# Patient Record
Sex: MALE | Race: WHITE | NOT HISPANIC OR LATINO | URBAN - METROPOLITAN AREA
[De-identification: names, ages, dates, MRNs, and addresses within clinical notes are randomized per-mention and may not be internally consistent; named-entity substitution may affect disease eponyms.]

---

## 2023-02-13 ENCOUNTER — TELEPHONE (OUTPATIENT)
Dept: ORTHOPEDICS | Facility: CLINIC | Age: 28
End: 2023-02-13
Payer: COMMERCIAL

## 2023-02-13 ENCOUNTER — OFFICE VISIT (OUTPATIENT)
Dept: URGENT CARE | Facility: CLINIC | Age: 28
End: 2023-02-13
Payer: COMMERCIAL

## 2023-02-13 VITALS
OXYGEN SATURATION: 98 % | RESPIRATION RATE: 18 BRPM | SYSTOLIC BLOOD PRESSURE: 153 MMHG | DIASTOLIC BLOOD PRESSURE: 81 MMHG | TEMPERATURE: 99 F | HEART RATE: 88 BPM

## 2023-02-13 DIAGNOSIS — S82.444A CLOSED NONDISPLACED SPIRAL FRACTURE OF SHAFT OF RIGHT FIBULA, INITIAL ENCOUNTER: ICD-10-CM

## 2023-02-13 DIAGNOSIS — T14.90XA TRAUMA: Primary | ICD-10-CM

## 2023-02-13 PROCEDURE — 1160F PR REVIEW ALL MEDS BY PRESCRIBER/CLIN PHARMACIST DOCUMENTED: ICD-10-PCS | Mod: CPTII,S$GLB,, | Performed by: SURGERY

## 2023-02-13 PROCEDURE — 99214 PR OFFICE/OUTPT VISIT, EST, LEVL IV, 30-39 MIN: ICD-10-PCS | Mod: S$GLB,,, | Performed by: SURGERY

## 2023-02-13 PROCEDURE — 73630 X-RAY EXAM OF FOOT: CPT | Mod: RT,S$GLB,, | Performed by: RADIOLOGY

## 2023-02-13 PROCEDURE — 73610 XR ANKLE COMPLETE 3 VIEW RIGHT: ICD-10-PCS | Mod: RT,S$GLB,, | Performed by: RADIOLOGY

## 2023-02-13 PROCEDURE — 1159F PR MEDICATION LIST DOCUMENTED IN MEDICAL RECORD: ICD-10-PCS | Mod: CPTII,S$GLB,, | Performed by: SURGERY

## 2023-02-13 PROCEDURE — 3079F PR MOST RECENT DIASTOLIC BLOOD PRESSURE 80-89 MM HG: ICD-10-PCS | Mod: CPTII,S$GLB,, | Performed by: SURGERY

## 2023-02-13 PROCEDURE — 3079F DIAST BP 80-89 MM HG: CPT | Mod: CPTII,S$GLB,, | Performed by: SURGERY

## 2023-02-13 PROCEDURE — 1159F MED LIST DOCD IN RCRD: CPT | Mod: CPTII,S$GLB,, | Performed by: SURGERY

## 2023-02-13 PROCEDURE — 3077F PR MOST RECENT SYSTOLIC BLOOD PRESSURE >= 140 MM HG: ICD-10-PCS | Mod: CPTII,S$GLB,, | Performed by: SURGERY

## 2023-02-13 PROCEDURE — 3077F SYST BP >= 140 MM HG: CPT | Mod: CPTII,S$GLB,, | Performed by: SURGERY

## 2023-02-13 PROCEDURE — 73630 XR FOOT COMPLETE 3 VIEW RIGHT: ICD-10-PCS | Mod: RT,S$GLB,, | Performed by: RADIOLOGY

## 2023-02-13 PROCEDURE — 73610 X-RAY EXAM OF ANKLE: CPT | Mod: RT,S$GLB,, | Performed by: RADIOLOGY

## 2023-02-13 PROCEDURE — 1160F RVW MEDS BY RX/DR IN RCRD: CPT | Mod: CPTII,S$GLB,, | Performed by: SURGERY

## 2023-02-13 PROCEDURE — 99214 OFFICE O/P EST MOD 30 MIN: CPT | Mod: S$GLB,,, | Performed by: SURGERY

## 2023-02-13 RX ORDER — OXYCODONE AND ACETAMINOPHEN 7.5; 325 MG/1; MG/1
1 TABLET ORAL EVERY 6 HOURS PRN
Qty: 15 TABLET | Refills: 0 | Status: SHIPPED | OUTPATIENT
Start: 2023-02-13

## 2023-02-13 NOTE — TELEPHONE ENCOUNTER
----- Message from Amy Cerrato LPN sent at 2/13/2023  3:17 PM CST -----    ----- Message -----  From: Natan Goel  Sent: 2/13/2023   2:59 PM CST  To: [facility] Ortho Triage    Good afternoon, Dr. Mary Jo Viveros calling from [facility] for Pt to be sched from a fall.    Order is in Active Requests- Closed nondisplaced spiral fracture of shaft of right fibula, initial encounter ...    Please eval and treat    Thank you,    Natan Goel  [facility]

## 2023-02-13 NOTE — PROGRESS NOTES
Subjective:       Patient ID: Garrett Hinkle is a 27 y.o. male.    Vitals:  core esophageal temperature is 98.7 °F (37.1 °C). His blood pressure is 153/81 (abnormal) and his pulse is 88. His respiration is 18 and oxygen saturation is 98%.     Chief Complaint: Ankle Pain    Patient presents with c.o rt ankle pain. Pain secondary to an injury that occurred while walking down the stairs. Patient with 5th metatarsal pain and significant bruising on the lateral aspect of hsi foot and ankle. No head trauma.     Ankle Pain   Incident onset: 2 days ago. Incident location: parents home. Injury mechanism: walking down steps. Pain location: rt ankle. The quality of the pain is described as aching. Pertinent negatives include no inability to bear weight, loss of motion or loss of sensation. He reports no foreign bodies present. The symptoms are aggravated by movement, palpation and weight bearing. He has tried nothing for the symptoms.   ROS    Objective:      Physical Exam   Constitutional: He is oriented to person, place, and time. He appears well-developed. He is cooperative.  Non-toxic appearance. He does not appear ill. No distress.   HENT:   Head: Normocephalic and atraumatic. Head is without abrasion, without contusion and without laceration.   Ears:   Right Ear: Hearing, tympanic membrane, external ear and ear canal normal. No hemotympanum.   Left Ear: Hearing, tympanic membrane, external ear and ear canal normal. No hemotympanum.   Nose: Nose normal. No mucosal edema, rhinorrhea or nasal deformity. No epistaxis. Right sinus exhibits no maxillary sinus tenderness and no frontal sinus tenderness. Left sinus exhibits no maxillary sinus tenderness and no frontal sinus tenderness.   Mouth/Throat: Uvula is midline, oropharynx is clear and moist and mucous membranes are normal. No trismus in the jaw. Normal dentition. No uvula swelling. No posterior oropharyngeal erythema.   Eyes: Conjunctivae, EOM and lids are normal.  Pupils are equal, round, and reactive to light. Right eye exhibits no discharge. Left eye exhibits no discharge. No scleral icterus.   Neck: Trachea normal and phonation normal. Neck supple. No tracheal deviation present. No neck rigidity present. No spinous process tenderness present. No muscular tenderness present.   Cardiovascular: Normal rate, regular rhythm, normal heart sounds and normal pulses.   Pulmonary/Chest: Effort normal and breath sounds normal. No respiratory distress.   Abdominal: Normal appearance and bowel sounds are normal. He exhibits no distension and no mass. Soft. There is no abdominal tenderness.   Musculoskeletal:         General: No deformity.      Right ankle: He exhibits decreased range of motion, swelling and ecchymosis. Tenderness. Lateral malleolus tenderness found.      Right foot: Swelling present. No tenderness or bony tenderness.   Neurological: He is alert and oriented to person, place, and time. He has normal strength. No cranial nerve deficit or sensory deficit. He exhibits normal muscle tone. He displays no seizure activity. Coordination normal. GCS eye subscore is 4. GCS verbal subscore is 5. GCS motor subscore is 6.   Skin: Skin is warm, dry, intact, not diaphoretic and not pale. Capillary refill takes less than 2 seconds. No abrasion, No burn, No bruising and No ecchymosis   Psychiatric: His speech is normal and behavior is normal. Judgment and thought content normal.   Nursing note and vitals reviewed.      Assessment:       1. Trauma    2. Closed nondisplaced spiral fracture of shaft of right fibula, initial encounter            Plan:         Trauma  -     XR ANKLE COMPLETE 3 VIEW RIGHT; Future; Expected date: 02/13/2023  -     XR FOOT COMPLETE 3 VIEW RIGHT; Future; Expected date: 02/13/2023    Closed nondisplaced spiral fracture of shaft of right fibula, initial encounter  -     Ambulatory referral/consult to Orthopedics  -     CRUTCHES FOR HOME USE    XR ANKLE COMPLETE 3  VIEW RIGHT    Result Date: 2/13/2023  EXAMINATION: XR ANKLE COMPLETE 3 VIEW RIGHT CLINICAL HISTORY: Injury, unspecified, initial encounter TECHNIQUE: AP, lateral, and oblique images of the right ankle were performed. COMPARISON: None FINDINGS: There is a nondisplaced oblique fracture identified involving the lateral malleolus.  Slight degree of soft tissue swelling noted.  No other fractures or dislocation.     See above Electronically signed by: Matt Tirado MD Date:    02/13/2023 Time:    14:27    XR FOOT COMPLETE 3 VIEW RIGHT    Result Date: 2/13/2023  EXAMINATION: XR FOOT COMPLETE 3 VIEW RIGHT CLINICAL HISTORY: . Injury, unspecified, initial encounter TECHNIQUE: AP, lateral, and oblique views of the right foot were performed. COMPARISON: None FINDINGS: There is a nondisplaced fracture identified involving the lateral malleolus.  No other fractures or dislocation.  Mild hallux valgus.  No bone destruction identified     See above Electronically signed by: Matt Tirado MD Date:    02/13/2023 Time:    14:26        Distal calf and foot with bruising and swelling, therefore will ACE wrap for compression and train with nonweightbearing/crutches until seen by ortho for definitive management. He has been walking on it for 2 days prior to this visit.   Pain management discussed with NSAID, rst, ice, elevation and backup narcotic prescribed to be used as needed. Precautions given re narcotics.    contacted to expedite ortho referral. Ortho clinic will be calling pt per protocol given diagnosis.

## 2023-02-13 NOTE — TELEPHONE ENCOUNTER
Spoke with pt.  Attempted to schedule an appointment with Orthopedics.   Pt stated he is returning to [place] on 2/15/23.  Pt will follow up with orthopedist at home.